# Patient Record
Sex: MALE | Race: WHITE | Employment: FULL TIME | URBAN - METROPOLITAN AREA
[De-identification: names, ages, dates, MRNs, and addresses within clinical notes are randomized per-mention and may not be internally consistent; named-entity substitution may affect disease eponyms.]

---

## 2020-05-08 ENCOUNTER — APPOINTMENT (OUTPATIENT)
Dept: GENERAL RADIOLOGY | Age: 65
DRG: 145 | End: 2020-05-08
Payer: COMMERCIAL

## 2020-05-08 ENCOUNTER — APPOINTMENT (OUTPATIENT)
Dept: CT IMAGING | Age: 65
DRG: 145 | End: 2020-05-08
Payer: COMMERCIAL

## 2020-05-08 ENCOUNTER — HOSPITAL ENCOUNTER (INPATIENT)
Age: 65
LOS: 4 days | Discharge: HOME | DRG: 145 | End: 2020-05-12
Attending: INTERNAL MEDICINE | Admitting: INTERNAL MEDICINE
Payer: COMMERCIAL

## 2020-05-08 PROBLEM — R06.02 SHORTNESS OF BREATH: Status: ACTIVE | Noted: 2020-05-08

## 2020-05-08 LAB
ALBUMIN SERPL-MCNC: 4 GM/DL (ref 3.4–5)
ALP BLD-CCNC: 77 IU/L (ref 40–128)
ALT SERPL-CCNC: 26 U/L (ref 10–40)
ANION GAP SERPL CALCULATED.3IONS-SCNC: 12 MMOL/L (ref 4–16)
AST SERPL-CCNC: 21 IU/L (ref 15–37)
BASOPHILS ABSOLUTE: 0.1 K/CU MM
BASOPHILS RELATIVE PERCENT: 0.6 % (ref 0–1)
BILIRUB SERPL-MCNC: 0.4 MG/DL (ref 0–1)
BUN BLDV-MCNC: 7 MG/DL (ref 6–23)
CALCIUM SERPL-MCNC: 9 MG/DL (ref 8.3–10.6)
CHLORIDE BLD-SCNC: 102 MMOL/L (ref 99–110)
CO2: 23 MMOL/L (ref 21–32)
CREAT SERPL-MCNC: 0.8 MG/DL (ref 0.9–1.3)
DIFFERENTIAL TYPE: ABNORMAL
EOSINOPHILS ABSOLUTE: 0.6 K/CU MM
EOSINOPHILS RELATIVE PERCENT: 5.5 % (ref 0–3)
GFR AFRICAN AMERICAN: >60 ML/MIN/1.73M2
GFR NON-AFRICAN AMERICAN: >60 ML/MIN/1.73M2
GLUCOSE BLD-MCNC: 129 MG/DL (ref 70–99)
HCT VFR BLD CALC: 43.4 % (ref 42–52)
HEMOGLOBIN: 14.6 GM/DL (ref 13.5–18)
IMMATURE NEUTROPHIL %: 0.4 % (ref 0–0.43)
LYMPHOCYTES ABSOLUTE: 1.5 K/CU MM
LYMPHOCYTES RELATIVE PERCENT: 13.2 % (ref 24–44)
MCH RBC QN AUTO: 30.5 PG (ref 27–31)
MCHC RBC AUTO-ENTMCNC: 33.6 % (ref 32–36)
MCV RBC AUTO: 90.8 FL (ref 78–100)
MONOCYTES ABSOLUTE: 0.8 K/CU MM
MONOCYTES RELATIVE PERCENT: 6.6 % (ref 0–4)
NUCLEATED RBC %: 0 %
PDW BLD-RTO: 12.9 % (ref 11.7–14.9)
PLATELET # BLD: 166 K/CU MM (ref 140–440)
PMV BLD AUTO: 11.4 FL (ref 7.5–11.1)
POTASSIUM SERPL-SCNC: 3.7 MMOL/L (ref 3.5–5.1)
PRO-BNP: 21.75 PG/ML
PROCALCITONIN: 0.07
RBC # BLD: 4.78 M/CU MM (ref 4.6–6.2)
SEGMENTED NEUTROPHILS ABSOLUTE COUNT: 8.3 K/CU MM
SEGMENTED NEUTROPHILS RELATIVE PERCENT: 73.7 % (ref 36–66)
SODIUM BLD-SCNC: 137 MMOL/L (ref 135–145)
TOTAL IMMATURE NEUTOROPHIL: 0.05 K/CU MM
TOTAL NUCLEATED RBC: 0 K/CU MM
TOTAL PROTEIN: 6.9 GM/DL (ref 6.4–8.2)
TROPONIN T: <0.01 NG/ML
WBC # BLD: 11.3 K/CU MM (ref 4–10.5)

## 2020-05-08 PROCEDURE — 99285 EMERGENCY DEPT VISIT HI MDM: CPT

## 2020-05-08 PROCEDURE — 87486 CHLMYD PNEUM DNA AMP PROBE: CPT

## 2020-05-08 PROCEDURE — 84145 PROCALCITONIN (PCT): CPT

## 2020-05-08 PROCEDURE — 87798 DETECT AGENT NOS DNA AMP: CPT

## 2020-05-08 PROCEDURE — 2060000000 HC ICU INTERMEDIATE R&B

## 2020-05-08 PROCEDURE — 94640 AIRWAY INHALATION TREATMENT: CPT

## 2020-05-08 PROCEDURE — 85025 COMPLETE CBC W/AUTO DIFF WBC: CPT

## 2020-05-08 PROCEDURE — U0002 COVID-19 LAB TEST NON-CDC: HCPCS

## 2020-05-08 PROCEDURE — 86141 C-REACTIVE PROTEIN HS: CPT

## 2020-05-08 PROCEDURE — 80053 COMPREHEN METABOLIC PANEL: CPT

## 2020-05-08 PROCEDURE — 93005 ELECTROCARDIOGRAM TRACING: CPT | Performed by: PHYSICIAN ASSISTANT

## 2020-05-08 PROCEDURE — 94761 N-INVAS EAR/PLS OXIMETRY MLT: CPT

## 2020-05-08 PROCEDURE — 87581 M.PNEUMON DNA AMP PROBE: CPT

## 2020-05-08 PROCEDURE — 2580000003 HC RX 258: Performed by: NURSE PRACTITIONER

## 2020-05-08 PROCEDURE — 6360000004 HC RX CONTRAST MEDICATION: Performed by: PHYSICIAN ASSISTANT

## 2020-05-08 PROCEDURE — 6370000000 HC RX 637 (ALT 250 FOR IP): Performed by: PHYSICIAN ASSISTANT

## 2020-05-08 PROCEDURE — 6370000000 HC RX 637 (ALT 250 FOR IP): Performed by: NURSE PRACTITIONER

## 2020-05-08 PROCEDURE — 71045 X-RAY EXAM CHEST 1 VIEW: CPT

## 2020-05-08 PROCEDURE — 2580000003 HC RX 258: Performed by: PHYSICIAN ASSISTANT

## 2020-05-08 PROCEDURE — 84484 ASSAY OF TROPONIN QUANT: CPT

## 2020-05-08 PROCEDURE — 71275 CT ANGIOGRAPHY CHEST: CPT

## 2020-05-08 PROCEDURE — 87633 RESP VIRUS 12-25 TARGETS: CPT

## 2020-05-08 PROCEDURE — 83880 ASSAY OF NATRIURETIC PEPTIDE: CPT

## 2020-05-08 PROCEDURE — 6360000002 HC RX W HCPCS: Performed by: NURSE PRACTITIONER

## 2020-05-08 RX ORDER — GUAIFENESIN/DEXTROMETHORPHAN 100-10MG/5
5 SYRUP ORAL EVERY 4 HOURS PRN
Status: DISCONTINUED | OUTPATIENT
Start: 2020-05-08 | End: 2020-05-12 | Stop reason: HOSPADM

## 2020-05-08 RX ORDER — ACETAMINOPHEN 650 MG/1
650 SUPPOSITORY RECTAL EVERY 6 HOURS PRN
Status: DISCONTINUED | OUTPATIENT
Start: 2020-05-08 | End: 2020-05-12 | Stop reason: HOSPADM

## 2020-05-08 RX ORDER — OMEPRAZOLE 20 MG/1
20 CAPSULE, DELAYED RELEASE ORAL DAILY
COMMUNITY

## 2020-05-08 RX ORDER — BENZONATATE 100 MG/1
200 CAPSULE ORAL ONCE
Status: COMPLETED | OUTPATIENT
Start: 2020-05-08 | End: 2020-05-08

## 2020-05-08 RX ORDER — DOXAZOSIN MESYLATE 4 MG/1
2 TABLET ORAL DAILY
Status: DISCONTINUED | OUTPATIENT
Start: 2020-05-09 | End: 2020-05-12 | Stop reason: HOSPADM

## 2020-05-08 RX ORDER — SODIUM CHLORIDE 0.9 % (FLUSH) 0.9 %
10 SYRINGE (ML) INJECTION EVERY 12 HOURS SCHEDULED
Status: DISCONTINUED | OUTPATIENT
Start: 2020-05-08 | End: 2020-05-12 | Stop reason: HOSPADM

## 2020-05-08 RX ORDER — ALBUTEROL SULFATE 90 UG/1
2 AEROSOL, METERED RESPIRATORY (INHALATION) EVERY 6 HOURS PRN
Status: DISCONTINUED | OUTPATIENT
Start: 2020-05-08 | End: 2020-05-12 | Stop reason: HOSPADM

## 2020-05-08 RX ORDER — ONDANSETRON 2 MG/ML
4 INJECTION INTRAMUSCULAR; INTRAVENOUS EVERY 6 HOURS PRN
Status: DISCONTINUED | OUTPATIENT
Start: 2020-05-08 | End: 2020-05-12 | Stop reason: HOSPADM

## 2020-05-08 RX ORDER — SODIUM CHLORIDE 9 MG/ML
INJECTION, SOLUTION INTRAVENOUS CONTINUOUS
Status: CANCELLED | OUTPATIENT
Start: 2020-05-08 | End: 2020-05-09

## 2020-05-08 RX ORDER — LANOLIN ALCOHOL/MO/W.PET/CERES
3 CREAM (GRAM) TOPICAL NIGHTLY PRN
Status: DISCONTINUED | OUTPATIENT
Start: 2020-05-08 | End: 2020-05-12 | Stop reason: HOSPADM

## 2020-05-08 RX ORDER — GUAIFENESIN AND CODEINE PHOSPHATE 100; 10 MG/5ML; MG/5ML
10 SOLUTION ORAL ONCE
Status: DISCONTINUED | OUTPATIENT
Start: 2020-05-08 | End: 2020-05-08

## 2020-05-08 RX ORDER — SODIUM CHLORIDE 9 MG/ML
INJECTION, SOLUTION INTRAVENOUS CONTINUOUS
Status: DISCONTINUED | OUTPATIENT
Start: 2020-05-08 | End: 2020-05-11

## 2020-05-08 RX ORDER — 0.9 % SODIUM CHLORIDE 0.9 %
1000 INTRAVENOUS SOLUTION INTRAVENOUS ONCE
Status: COMPLETED | OUTPATIENT
Start: 2020-05-08 | End: 2020-05-08

## 2020-05-08 RX ORDER — PANTOPRAZOLE SODIUM 40 MG/1
40 TABLET, DELAYED RELEASE ORAL
Status: DISCONTINUED | OUTPATIENT
Start: 2020-05-09 | End: 2020-05-12 | Stop reason: HOSPADM

## 2020-05-08 RX ORDER — SODIUM CHLORIDE 0.9 % (FLUSH) 0.9 %
10 SYRINGE (ML) INJECTION 2 TIMES DAILY
Status: DISCONTINUED | OUTPATIENT
Start: 2020-05-08 | End: 2020-05-12 | Stop reason: HOSPADM

## 2020-05-08 RX ORDER — PROMETHAZINE HYDROCHLORIDE 25 MG/1
12.5 TABLET ORAL EVERY 6 HOURS PRN
Status: DISCONTINUED | OUTPATIENT
Start: 2020-05-08 | End: 2020-05-12 | Stop reason: HOSPADM

## 2020-05-08 RX ORDER — METHYLPREDNISOLONE SODIUM SUCCINATE 40 MG/ML
40 INJECTION, POWDER, LYOPHILIZED, FOR SOLUTION INTRAMUSCULAR; INTRAVENOUS EVERY 8 HOURS SCHEDULED
Status: DISCONTINUED | OUTPATIENT
Start: 2020-05-08 | End: 2020-05-12 | Stop reason: HOSPADM

## 2020-05-08 RX ORDER — ALBUTEROL SULFATE 90 UG/1
2 AEROSOL, METERED RESPIRATORY (INHALATION) EVERY 6 HOURS PRN
COMMUNITY
Start: 2020-04-17

## 2020-05-08 RX ORDER — GUAIFENESIN 600 MG/1
600 TABLET, EXTENDED RELEASE ORAL ONCE
Status: COMPLETED | OUTPATIENT
Start: 2020-05-08 | End: 2020-05-08

## 2020-05-08 RX ORDER — POLYETHYLENE GLYCOL 3350 17 G/17G
17 POWDER, FOR SOLUTION ORAL DAILY PRN
Status: DISCONTINUED | OUTPATIENT
Start: 2020-05-08 | End: 2020-05-12 | Stop reason: HOSPADM

## 2020-05-08 RX ORDER — TERAZOSIN 2 MG/1
2 CAPSULE ORAL NIGHTLY
COMMUNITY
Start: 2020-04-28

## 2020-05-08 RX ORDER — ACETAMINOPHEN 325 MG/1
650 TABLET ORAL EVERY 6 HOURS PRN
Status: DISCONTINUED | OUTPATIENT
Start: 2020-05-08 | End: 2020-05-12 | Stop reason: HOSPADM

## 2020-05-08 RX ORDER — SODIUM CHLORIDE 0.9 % (FLUSH) 0.9 %
10 SYRINGE (ML) INJECTION PRN
Status: DISCONTINUED | OUTPATIENT
Start: 2020-05-08 | End: 2020-05-12 | Stop reason: HOSPADM

## 2020-05-08 RX ORDER — ALBUTEROL SULFATE 90 UG/1
2 AEROSOL, METERED RESPIRATORY (INHALATION) ONCE
Status: COMPLETED | OUTPATIENT
Start: 2020-05-08 | End: 2020-05-08

## 2020-05-08 RX ADMIN — GUAIFENESIN AND DEXTROMETHORPHAN 5 ML: 100; 10 SYRUP ORAL at 23:30

## 2020-05-08 RX ADMIN — METHYLPREDNISOLONE SODIUM SUCCINATE 40 MG: 40 INJECTION, POWDER, FOR SOLUTION INTRAMUSCULAR; INTRAVENOUS at 23:30

## 2020-05-08 RX ADMIN — ALBUTEROL SULFATE 2 PUFF: 90 AEROSOL, METERED RESPIRATORY (INHALATION) at 18:50

## 2020-05-08 RX ADMIN — SODIUM CHLORIDE: 9 INJECTION, SOLUTION INTRAVENOUS at 23:26

## 2020-05-08 RX ADMIN — GUAIFENESIN 600 MG: 600 TABLET, EXTENDED RELEASE ORAL at 21:27

## 2020-05-08 RX ADMIN — MELATONIN 3 MG: at 23:30

## 2020-05-08 RX ADMIN — SODIUM CHLORIDE, PRESERVATIVE FREE 10 ML: 5 INJECTION INTRAVENOUS at 23:30

## 2020-05-08 RX ADMIN — IOPAMIDOL 90 ML: 755 INJECTION, SOLUTION INTRAVENOUS at 19:59

## 2020-05-08 RX ADMIN — SODIUM CHLORIDE 1000 ML: 9 INJECTION, SOLUTION INTRAVENOUS at 21:27

## 2020-05-08 RX ADMIN — SODIUM CHLORIDE, PRESERVATIVE FREE 10 ML: 5 INJECTION INTRAVENOUS at 19:59

## 2020-05-08 RX ADMIN — AZITHROMYCIN MONOHYDRATE 500 MG: 500 INJECTION, POWDER, LYOPHILIZED, FOR SOLUTION INTRAVENOUS at 23:30

## 2020-05-08 RX ADMIN — BENZONATATE 200 MG: 100 CAPSULE ORAL at 18:47

## 2020-05-08 RX ADMIN — SODIUM CHLORIDE 1000 ML: 9 INJECTION, SOLUTION INTRAVENOUS at 18:47

## 2020-05-08 NOTE — ED NOTES
Bed: ED-02  Expected date:   Expected time:   Means of arrival:   Comments:  EMS     Gustavo Pickens RN  05/08/20 8585

## 2020-05-08 NOTE — ED PROVIDER NOTES
None   Lifestyle    Physical activity     Days per week: None     Minutes per session: None    Stress: None   Relationships    Social connections     Talks on phone: None     Gets together: None     Attends Mosque service: None     Active member of club or organization: None     Attends meetings of clubs or organizations: None     Relationship status: None    Intimate partner violence     Fear of current or ex partner: None     Emotionally abused: None     Physically abused: None     Forced sexual activity: None   Other Topics Concern    None   Social History Narrative    None       PHYSICAL EXAM    VITAL SIGNS: BP (!) 147/94   Pulse 113   Temp 98.7 °F (37.1 °C)   Resp 17   Ht 5' 7\" (1.702 m)   Wt 188 lb (85.3 kg)   SpO2 97%   BMI 29.44 kg/m²   Constitutional:  Well developed, well nourished, no acute distress, non-toxic appearance. Frequent harsh bronchospastic coughing spasms throughout exam.  Eyes: Conjunctiva normal, sclera non-icteric  HEENT:  Normocephalic, atraumatic. Nasal passages with mildly erythematous and edematous turbinates. No masses. Tolerating oral secretions. No hot potato voice. No inspiratory stridor. Neck/Lymphatics:    - supple, no JVD, no swollen nodes   - No nuchal rigidity. Trachea is midline. Respiratory:  94% on room air. 97% on 2 L patient has had improvement of shortness of breath on nasal cannula. Very coarse air exchange throughout with scattered expiratory wheezing or rhonchi. No rales. Speaking full sentences with difficulty. No retractions, no accessory muscle use  Cardiovascular: Tachycardic rate 120 bpm, regular rhythm. Normal S1/S2. GI:  Soft, no abdominal tenderness, no guarding. Musculoskeletal:  No obvious deficits, calves are symmetrical.  No pre-to pitting edema.     Integument:  Skin pink, warm, dry, intact       LABS:  Results for orders placed or performed during the hospital encounter of 05/08/20   CBC Auto Differential   Result Value Ref Range    WBC 11.3 (H) 4.0 - 10.5 K/CU MM    RBC 4.78 4.6 - 6.2 M/CU MM    Hemoglobin 14.6 13.5 - 18.0 GM/DL    Hematocrit 43.4 42 - 52 %    MCV 90.8 78 - 100 FL    MCH 30.5 27 - 31 PG    MCHC 33.6 32.0 - 36.0 %    RDW 12.9 11.7 - 14.9 %    Platelets 892 770 - 767 K/CU MM    MPV 11.4 (H) 7.5 - 11.1 FL    Differential Type AUTOMATED DIFFERENTIAL     Segs Relative 73.7 (H) 36 - 66 %    Lymphocytes % 13.2 (L) 24 - 44 %    Monocytes % 6.6 (H) 0 - 4 %    Eosinophils % 5.5 (H) 0 - 3 %    Basophils % 0.6 0 - 1 %    Segs Absolute 8.3 K/CU MM    Lymphocytes Absolute 1.5 K/CU MM    Monocytes Absolute 0.8 K/CU MM    Eosinophils Absolute 0.6 K/CU MM    Basophils Absolute 0.1 K/CU MM    Nucleated RBC % 0.0 %    Total Nucleated RBC 0.0 K/CU MM    Total Immature Neutrophil 0.05 K/CU MM    Immature Neutrophil % 0.4 0 - 0.43 %   Comprehensive Metabolic Panel   Result Value Ref Range    Sodium 137 135 - 145 MMOL/L    Potassium 3.7 3.5 - 5.1 MMOL/L    Chloride 102 99 - 110 mMol/L    CO2 23 21 - 32 MMOL/L    BUN 7 6 - 23 MG/DL    CREATININE 0.8 (L) 0.9 - 1.3 MG/DL    Glucose 129 (H) 70 - 99 MG/DL    Calcium 9.0 8.3 - 10.6 MG/DL    Alb 4.0 3.4 - 5.0 GM/DL    Total Protein 6.9 6.4 - 8.2 GM/DL    Total Bilirubin 0.4 0.0 - 1.0 MG/DL    ALT 26 10 - 40 U/L    AST 21 15 - 37 IU/L    Alkaline Phosphatase 77 40 - 128 IU/L    GFR Non-African American >60 >60 mL/min/1.73m2    GFR African American >60 >60 mL/min/1.73m2    Anion Gap 12 4 - 16   Troponin   Result Value Ref Range    Troponin T <0.010 <0.01 NG/ML   Brain Natriuretic Peptide   Result Value Ref Range    Pro-BNP 21.75 <300 PG/ML   EKG 12 Lead   Result Value Ref Range    Ventricular Rate 102 BPM    Atrial Rate 102 BPM    P-R Interval 140 ms    QRS Duration 90 ms    Q-T Interval 352 ms    QTc Calculation (Bazett) 458 ms    P Axis 60 degrees    R Axis 40 degrees    T Axis 72 degrees    Diagnosis       Sinus tachycardia  Otherwise normal ECG  No previous ECGs available centrally.  Small amount of secretions within a bronchus supplying  the posterior right upper lobe. Upper Abdomen: Small hiatal hernia. Soft Tissues/Bones: No acute bone or soft tissue abnormality.                    XR CHEST PORTABLE (Final result)   Result time 05/08/20 18:24:26   Final result by Darilyn Ormond, MD (05/08/20 18:24:26)                Impression:    No acute findings            Narrative:    EXAMINATION:  ONE XRAY VIEW OF THE CHEST    5/8/2020 5:30 pm    COMPARISON:  April 15, 2009    HISTORY:  ORDERING SYSTEM PROVIDED HISTORY: chest pain  TECHNOLOGIST PROVIDED HISTORY:  Reason for exam:->chest pain  Reason for Exam: chest pain, cough, sob x 7 weeks  Acuity: Acute  Type of Exam: Initial  Additional signs and symptoms: na  Relevant Medical/Surgical History: na    FINDINGS:  Cardiac silhouette is normal in size.  Lungs are clear.  No acute bony  abnormality. EKG Interpretation  Please see ED physician's note - Dr. Cortez Elliott - for EKG interpretation      ED COURSE & MEDICAL DECISION MAKING       Vital signs and nursing notes reviewed during ED course. I have independently evaluated this patient. Supervising physician - Dr. Cortez Elliott - present in the Emergency Department, available for consultation, throughout entirety of  patient care. All pertinent Lab data and radiographic results reviewed with patient at bedside. Clinical  IMPRESSION    1. Dyspnea, unspecified type    2. Bronchitis    3. Tachycardia        Patient presents with several weeks of persistent productive cough and shortness of breath following 4 rounds of antibiotics. On exam, nontoxic 81-year-old male, sitting comfortably upright in bed but frequent bronchospastic coughing spasms throughout exam.  94% on room air, was placed on to 2 L nasal cannula for comfort, improved to 97% had improvement of shortness of breath. Noted to be tachycardic but afebrile. HEENT exam is unremarkable.   Lungs with well as full PPE was followed throughout patient's ED course and evaluation. Patient was masked throughout ED course. Diagnosis, disposition, and plan discussed in detail with patient who understands and agrees. In consideration of current COVID19 pandemic, with effort to minimize unnecessary provider exposure, this patient was seen at bedside by me independently. However, in compliance with current hospital BURKE/ED protocol, prior to admission I did discuss this patient case with emergency department physician, Dr. Alvin Meza, who did agree with ED workup/evaluation and plan for admission. Of note, this Pt was NOT admitted to the ICU. Disposition referral (if applicable):  No follow-up provider specified. Disposition medications (if applicable):  New Prescriptions    No medications on file           Comment: Please note this report has been produced using speech recognition software and may contain errors related to that system including errors in grammar, punctuation, and spelling, as well as words and phrases that may be inappropriate. If there are any questions or concerns please feel free to contact the dictating provider for clarification.         Samir Aguilera PA-C  05/08/20 3317

## 2020-05-08 NOTE — ED PROVIDER NOTES
12 lead EKG per my interpretation:  Sinus Tachycardia at 102  Axis is   Normal  QTc is  within an acceptable range  There is no specific T wave changes appreciated. There is no specific ST wave changes appreciated. No STEMI    Prior EKG to compare with was not available.     Bakari Jeronimo MD  05/08/20 9533

## 2020-05-09 LAB
ADENOVIRUS DETECTION BY PCR: NOT DETECTED
ANION GAP SERPL CALCULATED.3IONS-SCNC: 11 MMOL/L (ref 4–16)
BASOPHILS ABSOLUTE: 0.1 K/CU MM
BASOPHILS RELATIVE PERCENT: 0.5 % (ref 0–1)
BORDETELLA PARAPERTUSSIS BY PCR: NOT DETECTED
BORDETELLA PERTUSSIS PCR: NOT DETECTED
BUN BLDV-MCNC: 5 MG/DL (ref 6–23)
CALCIUM SERPL-MCNC: 8.9 MG/DL (ref 8.3–10.6)
CHLAMYDOPHILA PNEUMONIA PCR: NOT DETECTED
CHLORIDE BLD-SCNC: 105 MMOL/L (ref 99–110)
CO2: 23 MMOL/L (ref 21–32)
CORONAVIRUS 229E PCR: NOT DETECTED
CORONAVIRUS HKU1 PCR: NOT DETECTED
CORONAVIRUS NL63 PCR: NOT DETECTED
CORONAVIRUS OC43 PCR: NOT DETECTED
CREAT SERPL-MCNC: 0.7 MG/DL (ref 0.9–1.3)
DIFFERENTIAL TYPE: ABNORMAL
EOSINOPHILS ABSOLUTE: 0.2 K/CU MM
EOSINOPHILS RELATIVE PERCENT: 1.5 % (ref 0–3)
GFR AFRICAN AMERICAN: >60 ML/MIN/1.73M2
GFR NON-AFRICAN AMERICAN: >60 ML/MIN/1.73M2
GLUCOSE BLD-MCNC: 144 MG/DL (ref 70–99)
HCT VFR BLD CALC: 44.8 % (ref 42–52)
HEMOGLOBIN: 14.5 GM/DL (ref 13.5–18)
HIGH SENSITIVE C-REACTIVE PROTEIN: 6 MG/L
HUMAN METAPNEUMOVIRUS PCR: NOT DETECTED
IMMATURE NEUTROPHIL %: 0.5 % (ref 0–0.43)
INFLUENZA A BY PCR: NOT DETECTED
INFLUENZA A H1 (2009) PCR: NOT DETECTED
INFLUENZA A H1 PANDEMIC PCR: NOT DETECTED
INFLUENZA A H3 PCR: NOT DETECTED
INFLUENZA B BY PCR: NOT DETECTED
LYMPHOCYTES ABSOLUTE: 0.7 K/CU MM
LYMPHOCYTES RELATIVE PERCENT: 7 % (ref 24–44)
MCH RBC QN AUTO: 30.4 PG (ref 27–31)
MCHC RBC AUTO-ENTMCNC: 32.4 % (ref 32–36)
MCV RBC AUTO: 93.9 FL (ref 78–100)
MONOCYTES ABSOLUTE: 0.1 K/CU MM
MONOCYTES RELATIVE PERCENT: 1.1 % (ref 0–4)
MYCOPLASMA PNEUMONIAE PCR: NOT DETECTED
NUCLEATED RBC %: 0 %
PARAINFLUENZA 1 PCR: NOT DETECTED
PARAINFLUENZA 2 PCR: NOT DETECTED
PARAINFLUENZA 3 PCR: NOT DETECTED
PARAINFLUENZA 4 PCR: NOT DETECTED
PDW BLD-RTO: 12.9 % (ref 11.7–14.9)
PLATELET # BLD: 175 K/CU MM (ref 140–440)
PMV BLD AUTO: 11.9 FL (ref 7.5–11.1)
POTASSIUM SERPL-SCNC: 4.8 MMOL/L (ref 3.5–5.1)
RBC # BLD: 4.77 M/CU MM (ref 4.6–6.2)
RHINOVIRUS ENTEROVIRUS PCR: NOT DETECTED
RSV PCR: NOT DETECTED
SEGMENTED NEUTROPHILS ABSOLUTE COUNT: 9.5 K/CU MM
SEGMENTED NEUTROPHILS RELATIVE PERCENT: 89.4 % (ref 36–66)
SODIUM BLD-SCNC: 139 MMOL/L (ref 135–145)
TOTAL IMMATURE NEUTOROPHIL: 0.05 K/CU MM
TOTAL NUCLEATED RBC: 0 K/CU MM
WBC # BLD: 10.6 K/CU MM (ref 4–10.5)

## 2020-05-09 PROCEDURE — 93010 ELECTROCARDIOGRAM REPORT: CPT | Performed by: INTERNAL MEDICINE

## 2020-05-09 PROCEDURE — 85025 COMPLETE CBC W/AUTO DIFF WBC: CPT

## 2020-05-09 PROCEDURE — 87040 BLOOD CULTURE FOR BACTERIA: CPT

## 2020-05-09 PROCEDURE — 2580000003 HC RX 258: Performed by: PHYSICIAN ASSISTANT

## 2020-05-09 PROCEDURE — 6370000000 HC RX 637 (ALT 250 FOR IP): Performed by: NURSE PRACTITIONER

## 2020-05-09 PROCEDURE — 87081 CULTURE SCREEN ONLY: CPT

## 2020-05-09 PROCEDURE — 6360000002 HC RX W HCPCS: Performed by: NURSE PRACTITIONER

## 2020-05-09 PROCEDURE — 94640 AIRWAY INHALATION TREATMENT: CPT

## 2020-05-09 PROCEDURE — 94761 N-INVAS EAR/PLS OXIMETRY MLT: CPT

## 2020-05-09 PROCEDURE — 6370000000 HC RX 637 (ALT 250 FOR IP): Performed by: INTERNAL MEDICINE

## 2020-05-09 PROCEDURE — 2580000003 HC RX 258: Performed by: NURSE PRACTITIONER

## 2020-05-09 PROCEDURE — 2700000000 HC OXYGEN THERAPY PER DAY

## 2020-05-09 PROCEDURE — 2060000000 HC ICU INTERMEDIATE R&B

## 2020-05-09 PROCEDURE — 80048 BASIC METABOLIC PNL TOTAL CA: CPT

## 2020-05-09 RX ORDER — BUDESONIDE AND FORMOTEROL FUMARATE DIHYDRATE 160; 4.5 UG/1; UG/1
2 AEROSOL RESPIRATORY (INHALATION) 2 TIMES DAILY
Status: DISCONTINUED | OUTPATIENT
Start: 2020-05-09 | End: 2020-05-12 | Stop reason: HOSPADM

## 2020-05-09 RX ORDER — FLUTICASONE PROPIONATE 50 MCG
1 SPRAY, SUSPENSION (ML) NASAL DAILY
Status: DISCONTINUED | OUTPATIENT
Start: 2020-05-09 | End: 2020-05-11

## 2020-05-09 RX ORDER — LACTOBACILLUS RHAMNOSUS GG 10B CELL
1 CAPSULE ORAL DAILY
Status: DISCONTINUED | OUTPATIENT
Start: 2020-05-09 | End: 2020-05-12 | Stop reason: HOSPADM

## 2020-05-09 RX ADMIN — METHYLPREDNISOLONE SODIUM SUCCINATE 40 MG: 40 INJECTION, POWDER, FOR SOLUTION INTRAMUSCULAR; INTRAVENOUS at 21:27

## 2020-05-09 RX ADMIN — SODIUM CHLORIDE: 9 INJECTION, SOLUTION INTRAVENOUS at 08:58

## 2020-05-09 RX ADMIN — BUDESONIDE AND FORMOTEROL FUMARATE DIHYDRATE 2 PUFF: 160; 4.5 AEROSOL RESPIRATORY (INHALATION) at 21:31

## 2020-05-09 RX ADMIN — PANTOPRAZOLE SODIUM 40 MG: 40 TABLET, DELAYED RELEASE ORAL at 06:35

## 2020-05-09 RX ADMIN — SODIUM CHLORIDE, PRESERVATIVE FREE 10 ML: 5 INJECTION INTRAVENOUS at 21:27

## 2020-05-09 RX ADMIN — METHYLPREDNISOLONE SODIUM SUCCINATE 40 MG: 40 INJECTION, POWDER, FOR SOLUTION INTRAMUSCULAR; INTRAVENOUS at 06:35

## 2020-05-09 RX ADMIN — SODIUM CHLORIDE, PRESERVATIVE FREE 10 ML: 5 INJECTION INTRAVENOUS at 08:59

## 2020-05-09 RX ADMIN — ALBUTEROL SULFATE 2 PUFF: 90 AEROSOL, METERED RESPIRATORY (INHALATION) at 15:21

## 2020-05-09 RX ADMIN — METHYLPREDNISOLONE SODIUM SUCCINATE 40 MG: 40 INJECTION, POWDER, FOR SOLUTION INTRAMUSCULAR; INTRAVENOUS at 14:50

## 2020-05-09 RX ADMIN — FLUTICASONE PROPIONATE 1 SPRAY: 50 SPRAY, METERED NASAL at 11:35

## 2020-05-09 RX ADMIN — AZITHROMYCIN MONOHYDRATE 250 MG: 500 INJECTION, POWDER, LYOPHILIZED, FOR SOLUTION INTRAVENOUS at 23:03

## 2020-05-09 RX ADMIN — SODIUM CHLORIDE: 9 INJECTION, SOLUTION INTRAVENOUS at 20:06

## 2020-05-09 RX ADMIN — DOXAZOSIN 2 MG: 4 TABLET ORAL at 08:57

## 2020-05-09 RX ADMIN — Medication 1 CAPSULE: at 11:35

## 2020-05-09 RX ADMIN — SODIUM CHLORIDE, PRESERVATIVE FREE 10 ML: 5 INJECTION INTRAVENOUS at 08:58

## 2020-05-09 RX ADMIN — MELATONIN 3 MG: at 23:03

## 2020-05-09 RX ADMIN — GUAIFENESIN AND DEXTROMETHORPHAN 5 ML: 100; 10 SYRUP ORAL at 23:03

## 2020-05-09 ASSESSMENT — PAIN SCALES - GENERAL
PAINLEVEL_OUTOF10: 0
PAINLEVEL_OUTOF10: 0

## 2020-05-09 NOTE — H&P
about 8 weeks ago with sinus drainage, sinus pressure. Cough began several days later, described as post-nasal drip cough. About 4 weeks later, he still had cough, which is productive of clear sputum. Denies fever/chills, myalgias entire episode. He has recently completed azithromycin, which he reports improvement on for 2 days, then symptoms worsened again. He started doxycycline yesterday. He has not been on steroids. He denies hx of asthma, copd. Denies chronic cough prior to sx onset. States he quit smoking 11-12 years ago. Only PMH is HTN. States he presented to ED because wheezing has worsened. At time of exam, denies chest pain/pressure, abdominal pain, back pain, n/v, diarrhea. Confirms code status. Denies regular alcohol use, illicit drug use. Ten point ROS reviewed negative, unless as noted above    Objective:     No intake or output data in the 24 hours ending 05/08/20 2056     Vitals:   Vitals:    05/08/20 1902   BP: (!) 147/94   Pulse: 113   Resp: 17   Temp: 98.7   SpO2: 97%       Physical Exam:     GEN Awake male, sitting upright in bed in no apparent distress. Appears given age. EYES Pupils are equally round. No scleral erythema, discharge, or conjunctivitis. HENT Mucous membranes are moist. Oral pharynx without exudates, no evidence of thrush. NECK Supple, no apparent thyromegaly or masses. RESP Bilateral wheezes throughout. No rales or rhonchi. Symmetric chest movement while on 2 L NC  CARDIO/VASC S1/S2 auscultated. Regular rate without appreciable murmurs, rubs, or gallops. No JVD or carotid bruits. Peripheral pulses equal bilaterally and palpable. No peripheral edema. GI Abdomen is soft without significant tenderness, masses, or guarding. Bowel sounds are normoactive. Rectal exam deferred.  No costovertebral angle tenderness. Steele catheter is not present. HEME/LYMPH No palpable cervical lymphadenopathy and no hepatosplenomegaly. No petechiae or ecchymoses.   MSK No gross joint deformities. Strength equal in BLE and BUE  SKIN Normal coloration, warm, dry. NEURO Cranial nerves appear grossly intact, normal speech, no lateralizing weakness. PSYCH Awake, alert, oriented x 4. Affect appropriate. Past Medical History:        Past Medical History:   Diagnosis Date    Hypertension        PSHX:  Denies     Allergies: Allergies   Allergen Reactions    Amlodipine Swelling and Other (See Comments)     Dizzy    Levofloxacin Other (See Comments)     \"The only way to describe it was it made me feel terrible. \"    Lisinopril Other (See Comments) and Swelling     Lips became swollen and discolored.  Also lightheadedness and low energy    Cephalexin Other (See Comments)     \"Feels terrible\"    Clindamycin Other (See Comments)     \"Feel weird, when I'm driving\"       FAM HX: Denies known family history    Soc HX:   Social History     Socioeconomic History    Marital status:      Spouse name: None    Number of children: None    Years of education: None    Highest education level: None   Occupational History    None   Social Needs    Financial resource strain: None    Food insecurity     Worry: None     Inability: None    Transportation needs     Medical: None     Non-medical: None   Tobacco Use    Smoking status: Former Smoker     Packs/day: 1.00     Types: Cigarettes     Last attempt to quit:      Years since quittin.3    Smokeless tobacco: Never Used   Substance and Sexual Activity    Alcohol use: Not Currently    Drug use: Never    Sexual activity: None   Lifestyle    Physical activity     Days per week: None     Minutes per session: None    Stress: None   Relationships    Social connections     Talks on phone: None     Gets together: None     Attends Mormonism service: None     Active member of club or organization: None     Attends meetings of clubs or organizations: None     Relationship status: None    Intimate partner violence     Fear of current or ex partner: None     Emotionally abused: None     Physically abused: None     Forced sexual activity: None   Other Topics Concern    None   Social History Narrative    None       Medications:     Medications:    sodium chloride flush  10 mL Intravenous BID    sodium chloride  1,000 mL Intravenous Once    guaiFENesin  600 mg Oral Once      albuterol sulfate  (90 Base) MCG/ACT inhaler Inhale 2 puffs into the lungs every 6 hours as needed Historical Provider, MD Needs Review   terazosin (HYTRIN) 2 MG capsule Take 2 mg by mouth nightly Historical Provider, MD Needs Review   omeprazole (PRILOSEC) 20 MG delayed release capsule Take 20 mg by mouth daily Historical Provider, MD Needs Review       Data:     CTA CHEST W CONTRAST [[id number]] Collected: 05/08/20 2005      Order Status: Completed Updated: 05/08/20 2012     Narrative:       EXAMINATION:  CTA OF THE CHEST 5/8/2020 8:00 pm    TECHNIQUE:  CTA of the chest was performed after the administration of intravenous  contrast.  Multiplanar reformatted images are provided for review.  MIP  images are provided for review. Dose modulation, iterative reconstruction,  and/or weight based adjustment of the mA/kV was utilized to reduce the  radiation dose to as low as reasonably achievable. COMPARISON:  None. HISTORY:  ORDERING SYSTEM PROVIDED HISTORY: cough x7 weeks, long distance , eval  for PE  TECHNOLOGIST PROVIDED HISTORY:  Reason for exam:->cough x7 weeks, long distance , eval for PE  Reason for Exam: cough x7 weeks, long distance , eval for PE  Acuity: Acute  Type of Exam: Initial  Additional signs and symptoms: no  Relevant Medical/Surgical History: 90 ml isovue 370 used    FINDINGS:  Pulmonary Arteries: Pulmonary arteries are adequately opacified for  evaluation.  No evidence of intraluminal filling defect to suggest pulmonary  embolism.  Main pulmonary artery is normal in caliber.     Mediastinum: No evidence of mediastinal

## 2020-05-09 NOTE — CONSULTS
patient. REVIEW OF SYSTEMS:    CONSTITUTIONAL:  negative for fevers, chills, diaphoresis, activity change, appetite change, fatigue, night sweats and unexpected weight change. EYES:  negative for blurred vision, eye discharge, visual disturbance and icterus  HEENT:  negative for hearing loss, tinnitus, ear drainage, sinus pressure, nasal congestion, epistaxis and snoring  RESPIRATORY:  See HPI  CARDIOVASCULAR:  negative for chest pain, palpitations, exertional chest pressure/discomfort, edema, syncope  GASTROINTESTINAL:  negative for nausea, vomiting, diarrhea, constipation, blood in stool and abdominal pain  GENITOURINARY:  negative for frequency, dysuria and hematuria  HEMATOLOGIC/LYMPHATIC:  negative for easy bruising, bleeding and lymphadenopathy  ALLERGIC/IMMUNOLOGIC:  negative for recurrent infections, angioedema, anaphylaxis and drug reactions  ENDOCRINE:  negative for weight changes and diabetic symptoms including polyuria, polydipsia and polyphagia    MUSCULOSKELETAL:  negative for  pain, joint swelling, decreased range of motion and muscle weakness  NEUROLOGICAL:  negative for headaches, slurred speech, unilateral weakness  PSYCHIATRIC/BEHAVIORAL: negative for hallucinations, behavioral problems, confusion and agitation. Objective:   PHYSICAL EXAM:      VITALS:    Vitals:    05/08/20 2247 05/09/20 0223 05/09/20 0855 05/09/20 0917   BP:  127/71 (!) 141/84    Pulse:  110 103    Resp:  18 18 15   Temp: 98.7 °F (37.1 °C) 98.7 °F (37.1 °C) 97.9 °F (36.6 °C)    TempSrc: Oral Oral Oral    SpO2:  95% 94% 93%   Weight: 172 lb 13.5 oz (78.4 kg)      Height: 5' 7\" (1.702 m)            CONSTITUTIONAL:  awake, alert, cooperative, no apparent distress, and appears stated age  NECK:  Supple, symmetrical, trachea midline, no adenopathy, thyroid symmetric, not enlarged and no tenderness  CHEST: Chest expansion equal and symmetrical, no intercostal retraction.   LUNGS:  no increased work of breathing, has Lungs/pleura: No pneumothorax or pleural effusion.  Mild bronchial wall   thickening centrally.  Small amount of secretions within a bronchus supplying   the posterior right upper lobe.     Upper Abdomen: Small hiatal hernia.       Soft Tissues/Bones: No acute bone or soft tissue abnormality.           Impression   No pulmonary embolus.       Mild bronchial wall thickening centrally as well as small amount of   secretions within a bronchus in the posterior right upper lobe.           Order History     Open Order Details                     Assessment:     1. Ac bronchospasm probable asthma  2. [id number] rhinitis  3. Ac bronchitis          Plan:     1. D/w pt  2. o2 as needed  3. Bd rx  4. Will start symbicort  5.  cont iv steroids  6. Agree with antibx  7. Full pft as outpt  8.  Thanks will follow

## 2020-05-10 LAB
CULTURE: NORMAL
EMERGENT DISEASE RESULT: NOT DETECTED
Lab: NORMAL
SPECIMEN: NORMAL

## 2020-05-10 PROCEDURE — 94761 N-INVAS EAR/PLS OXIMETRY MLT: CPT

## 2020-05-10 PROCEDURE — 6370000000 HC RX 637 (ALT 250 FOR IP): Performed by: INTERNAL MEDICINE

## 2020-05-10 PROCEDURE — 6370000000 HC RX 637 (ALT 250 FOR IP): Performed by: NURSE PRACTITIONER

## 2020-05-10 PROCEDURE — 6360000002 HC RX W HCPCS: Performed by: NURSE PRACTITIONER

## 2020-05-10 PROCEDURE — 94640 AIRWAY INHALATION TREATMENT: CPT

## 2020-05-10 PROCEDURE — 2060000000 HC ICU INTERMEDIATE R&B

## 2020-05-10 PROCEDURE — 2580000003 HC RX 258: Performed by: PHYSICIAN ASSISTANT

## 2020-05-10 PROCEDURE — 2580000003 HC RX 258: Performed by: NURSE PRACTITIONER

## 2020-05-10 RX ADMIN — BUDESONIDE AND FORMOTEROL FUMARATE DIHYDRATE 2 PUFF: 160; 4.5 AEROSOL RESPIRATORY (INHALATION) at 19:57

## 2020-05-10 RX ADMIN — Medication 1 CAPSULE: at 09:55

## 2020-05-10 RX ADMIN — SODIUM CHLORIDE, PRESERVATIVE FREE 10 ML: 5 INJECTION INTRAVENOUS at 21:54

## 2020-05-10 RX ADMIN — PANTOPRAZOLE SODIUM 40 MG: 40 TABLET, DELAYED RELEASE ORAL at 06:06

## 2020-05-10 RX ADMIN — SODIUM CHLORIDE, PRESERVATIVE FREE 10 ML: 5 INJECTION INTRAVENOUS at 08:42

## 2020-05-10 RX ADMIN — SODIUM CHLORIDE, PRESERVATIVE FREE 10 ML: 5 INJECTION INTRAVENOUS at 08:38

## 2020-05-10 RX ADMIN — FLUTICASONE PROPIONATE 1 SPRAY: 50 SPRAY, METERED NASAL at 08:38

## 2020-05-10 RX ADMIN — SODIUM CHLORIDE, PRESERVATIVE FREE 10 ML: 5 INJECTION INTRAVENOUS at 21:55

## 2020-05-10 RX ADMIN — METHYLPREDNISOLONE SODIUM SUCCINATE 40 MG: 40 INJECTION, POWDER, FOR SOLUTION INTRAMUSCULAR; INTRAVENOUS at 06:06

## 2020-05-10 RX ADMIN — SODIUM CHLORIDE: 9 INJECTION, SOLUTION INTRAVENOUS at 15:30

## 2020-05-10 RX ADMIN — BUDESONIDE AND FORMOTEROL FUMARATE DIHYDRATE 2 PUFF: 160; 4.5 AEROSOL RESPIRATORY (INHALATION) at 09:01

## 2020-05-10 RX ADMIN — METHYLPREDNISOLONE SODIUM SUCCINATE 40 MG: 40 INJECTION, POWDER, FOR SOLUTION INTRAMUSCULAR; INTRAVENOUS at 15:02

## 2020-05-10 RX ADMIN — METHYLPREDNISOLONE SODIUM SUCCINATE 40 MG: 40 INJECTION, POWDER, FOR SOLUTION INTRAMUSCULAR; INTRAVENOUS at 21:53

## 2020-05-10 RX ADMIN — AZITHROMYCIN MONOHYDRATE 250 MG: 500 INJECTION, POWDER, LYOPHILIZED, FOR SOLUTION INTRAVENOUS at 21:54

## 2020-05-10 RX ADMIN — SODIUM CHLORIDE: 9 INJECTION, SOLUTION INTRAVENOUS at 06:06

## 2020-05-10 RX ADMIN — DOXAZOSIN 2 MG: 4 TABLET ORAL at 08:41

## 2020-05-10 ASSESSMENT — PAIN SCALES - GENERAL
PAINLEVEL_OUTOF10: 0

## 2020-05-11 LAB
CULTURE: NORMAL
GRAM SMEAR: NORMAL
Lab: NORMAL
SPECIMEN: NORMAL

## 2020-05-11 PROCEDURE — 94761 N-INVAS EAR/PLS OXIMETRY MLT: CPT

## 2020-05-11 PROCEDURE — 6370000000 HC RX 637 (ALT 250 FOR IP): Performed by: NURSE PRACTITIONER

## 2020-05-11 PROCEDURE — 2580000003 HC RX 258: Performed by: NURSE PRACTITIONER

## 2020-05-11 PROCEDURE — 6370000000 HC RX 637 (ALT 250 FOR IP): Performed by: HOSPITALIST

## 2020-05-11 PROCEDURE — 6360000002 HC RX W HCPCS: Performed by: NURSE PRACTITIONER

## 2020-05-11 PROCEDURE — 2580000003 HC RX 258: Performed by: PHYSICIAN ASSISTANT

## 2020-05-11 PROCEDURE — 94640 AIRWAY INHALATION TREATMENT: CPT

## 2020-05-11 PROCEDURE — 6370000000 HC RX 637 (ALT 250 FOR IP): Performed by: INTERNAL MEDICINE

## 2020-05-11 PROCEDURE — 1200000000 HC SEMI PRIVATE

## 2020-05-11 RX ORDER — OXYMETAZOLINE HYDROCHLORIDE 0.05 G/100ML
2 SPRAY NASAL 2 TIMES DAILY
Status: DISCONTINUED | OUTPATIENT
Start: 2020-05-11 | End: 2020-05-12 | Stop reason: HOSPADM

## 2020-05-11 RX ORDER — FLUTICASONE PROPIONATE 50 MCG
1 SPRAY, SUSPENSION (ML) NASAL 2 TIMES DAILY
Status: DISCONTINUED | OUTPATIENT
Start: 2020-05-11 | End: 2020-05-12 | Stop reason: HOSPADM

## 2020-05-11 RX ADMIN — Medication 1 CAPSULE: at 09:33

## 2020-05-11 RX ADMIN — DOXAZOSIN 2 MG: 4 TABLET ORAL at 09:33

## 2020-05-11 RX ADMIN — AZITHROMYCIN MONOHYDRATE 250 MG: 500 INJECTION, POWDER, LYOPHILIZED, FOR SOLUTION INTRAVENOUS at 23:15

## 2020-05-11 RX ADMIN — OXYMETAZOLINE HYDROCHLORIDE 2 SPRAY: 5 SPRAY NASAL at 12:29

## 2020-05-11 RX ADMIN — METHYLPREDNISOLONE SODIUM SUCCINATE 40 MG: 40 INJECTION, POWDER, FOR SOLUTION INTRAMUSCULAR; INTRAVENOUS at 06:14

## 2020-05-11 RX ADMIN — SODIUM CHLORIDE: 9 INJECTION, SOLUTION INTRAVENOUS at 03:30

## 2020-05-11 RX ADMIN — FLUTICASONE PROPIONATE 1 SPRAY: 50 SPRAY, METERED NASAL at 09:35

## 2020-05-11 RX ADMIN — PANTOPRAZOLE SODIUM 40 MG: 40 TABLET, DELAYED RELEASE ORAL at 06:14

## 2020-05-11 RX ADMIN — METHYLPREDNISOLONE SODIUM SUCCINATE 40 MG: 40 INJECTION, POWDER, FOR SOLUTION INTRAMUSCULAR; INTRAVENOUS at 20:50

## 2020-05-11 RX ADMIN — GUAIFENESIN AND DEXTROMETHORPHAN 5 ML: 100; 10 SYRUP ORAL at 20:50

## 2020-05-11 RX ADMIN — BUDESONIDE AND FORMOTEROL FUMARATE DIHYDRATE 2 PUFF: 160; 4.5 AEROSOL RESPIRATORY (INHALATION) at 20:05

## 2020-05-11 RX ADMIN — MELATONIN 3 MG: at 20:50

## 2020-05-11 RX ADMIN — SODIUM CHLORIDE, PRESERVATIVE FREE 10 ML: 5 INJECTION INTRAVENOUS at 09:34

## 2020-05-11 RX ADMIN — OXYMETAZOLINE HYDROCHLORIDE 2 SPRAY: 5 SPRAY NASAL at 20:53

## 2020-05-11 RX ADMIN — BUDESONIDE AND FORMOTEROL FUMARATE DIHYDRATE 2 PUFF: 160; 4.5 AEROSOL RESPIRATORY (INHALATION) at 08:14

## 2020-05-11 RX ADMIN — METHYLPREDNISOLONE SODIUM SUCCINATE 40 MG: 40 INJECTION, POWDER, FOR SOLUTION INTRAMUSCULAR; INTRAVENOUS at 16:43

## 2020-05-11 RX ADMIN — SODIUM CHLORIDE, PRESERVATIVE FREE 10 ML: 5 INJECTION INTRAVENOUS at 20:52

## 2020-05-11 ASSESSMENT — PAIN SCALES - GENERAL
PAINLEVEL_OUTOF10: 0

## 2020-05-11 NOTE — PROGRESS NOTES
Hospitalist Progress Note      Name:  Candelario Copeland /Age/Sex: 1955  (63 y.o. male)   MRN & CSN:  [id number] & [id number] Admission Date/Time: 2020  5:16 PM   Location:  -A PCP: Alexia Diaz MD         Hospital Day: 4    Assessment and Plan:   Sandeep Almonte a 63 y.o.  male  who presents with cough, shortness of breath     1) Acute on chronic Bronchitis; likely background undiagnosed COPD  CTA without acute process or PE. Mild bronchial wall thickening centrally as well as small amount of secretions within a bronchus in the posterior RUL   Started on breathing treatment, steroids and abx  COVID 19 pending  CRP and Procal both not elevated  Pulmo on board  Patient will benefit from OP PFT  - breathing is improving. Start Afrin for sinus drainage. COVID-19 negative. Patient continues to suction drainage from the back of his throat. Pulmonology has increased Flonase.           Chronic medical conditions; medication resumed unless contraindicated  - HTN    Diet DIET GENERAL; No Added Salt (3-4 GM)   DVT Prophylaxis [] Lovenox, []  Heparin, [] SCDs, [] Ambulation   GI Prophylaxis [] PPI,  [] H2 Blocker,  [] Carafate,  [] Diet/Tube Feeds   Code Status Full Code   Disposition TRANSFER OFF COVID FLOOR   MDM      History of Present Illness:     -patient still complains of cough and sinus drainage  -he has been suctioning himself    Ten point ROS reviewed negative, unless as noted above    Objective: Intake/Output Summary (Last 24 hours) at 2020 0908  Last data filed at 2020  Gross per 24 hour   Intake 3080 ml   Output 2025 ml   Net 1055 ml      Vitals:   Vitals:    20 0831   BP: (!) 143/79   Pulse: 91   Resp: 16   Temp: 97.2 °F (36.2 °C)   SpO2: 95%     Physical Exam:   GEN Awake male, sitting upright in bed in no apparent distress. Appears given age. HEENT NCAT  RESP Mostly clear, may have slight wheeze   CARDIO/VASC S1/S2 auscultated.  RRR  GI Soft nt nd

## 2020-05-11 NOTE — PLAN OF CARE
Problem: Infection:  Goal: Will remain free from infection  Description: Will remain free from infection  5/10/2020 2336 by Erendira Kaba RN  Outcome: Ongoing  5/10/2020 1552 by Efra Clark RN  Outcome: Ongoing     Problem: Safety:  Goal: Free from accidental physical injury  Description: Free from accidental physical injury  5/10/2020 2336 by Erendira Kaba RN  Outcome: Ongoing  5/10/2020 1552 by Efra Clark RN  Outcome: Ongoing  Goal: Free from intentional harm  Description: Free from intentional harm  5/10/2020 2336 by Erendira Kaba RN  Outcome: Ongoing  5/10/2020 1552 by Efra Clark RN  Outcome: Ongoing     Problem: Daily Care:  Goal: Daily care needs are met  Description: Daily care needs are met  5/10/2020 2336 by Erendira Kaba RN  Outcome: Ongoing  5/10/2020 1552 by Efra Clark RN  Outcome: Ongoing     Problem: Pain:  Goal: Patient's pain/discomfort is manageable  Description: Patient's pain/discomfort is manageable  5/10/2020 2336 by Erendira Kaba RN  Outcome: Ongoing  5/10/2020 1552 by Efra Clark RN  Outcome: Ongoing     Problem: Discharge Planning:  Goal: Patients continuum of care needs are met  Description: Patients continuum of care needs are met  5/10/2020 2336 by Erendira Kaba RN  Outcome: Ongoing  5/10/2020 1552 by Efra Clark RN  Outcome: Ongoing     Problem: Breathing Pattern - Ineffective:  Goal: Ability to achieve and maintain a regular respiratory rate will improve  Description: Ability to achieve and maintain a regular respiratory rate will improve  5/10/2020 2336 by Erendira Kaba RN  Outcome: Ongoing  5/10/2020 1552 by Efra Clark RN  Outcome: Ongoing     Problem: Falls - Risk of:  Goal: Will remain free from falls  Description: Will remain free from falls  5/10/2020 2336 by Erendira Kaba RN  Outcome: Ongoing  5/10/2020 1552 by Efra Clark RN  Outcome: Ongoing  Goal: Absence of physical injury  Description: Absence of physical

## 2020-05-11 NOTE — PROGRESS NOTES
Pt alert and oriented. Covid test negative and hospitalist notified. Reply was to have the rounding doctor give transfer orders off the floor in AM.  Pt continues to have a productive cough. Ambulates independently in room.

## 2020-05-11 NOTE — PROGRESS NOTES
pulmonary      SUBJECTIVE:  Upper airways secretuions a major issue for pt     OBJECTIVE    VITALS:  BP (!) 143/79   Pulse 91   Temp 97.2 °F (36.2 °C) (Oral)   Resp 16   Ht 5' 7\" (1.702 m)   Wt 193 lb 12.6 oz (87.9 kg)   SpO2 95%   BMI 30.35 kg/m²   HEAD AND FACE EXAM:  No throat injection, no active exudate,no thrush  NECK EXAM;No JVD, no masses, symmetrical  CHEST EXAM; Expansion equal and symmetrical, no masses  LUNG EXAM; Good breath sounds bilaterally. There are expiratory wheezes both lungs, there are crackles at both lung bases  CARDIOVASCULAR EXAM: Positive S1 and S2, no S3 or S4, no clicks ,no murmurs  RIGHT AND LEFT LOWER EXTRIMITY EXAM: No edema, no swelling, no inflamation  CNS EXAM: Alert and oriented X3          LABS   Lab Results   Component Value Date    WBC 10.6 (H) 05/09/2020    HGB 14.5 05/09/2020    HCT 44.8 05/09/2020    MCV 93.9 05/09/2020     05/09/2020     Lab Results   Component Value Date    CREATININE 0.7 (L) 05/09/2020    BUN 5 (L) 05/09/2020     05/09/2020    K 4.8 05/09/2020     05/09/2020    CO2 23 05/09/2020     No results found for: INR, PROTIME     No results found for: PHOS   No results for input(s): PH, PO2ART, NPM7BGH, HCO3, BEART, O2SAT in the last 72 hours. Wt Readings from Last 3 Encounters:   05/11/20 193 lb 12.6 oz (87.9 kg)               ASSESMENT  Ac bronchospasm  Ac bronchitis  covid 2 test negative        PLAN  1. cpm  2. Increase flonase  3.  Transfer out of covid unit    5/11/2020  Noam Marquez M.D.

## 2020-05-12 VITALS
SYSTOLIC BLOOD PRESSURE: 150 MMHG | WEIGHT: 202.5 LBS | HEIGHT: 67 IN | DIASTOLIC BLOOD PRESSURE: 81 MMHG | TEMPERATURE: 97.9 F | BODY MASS INDEX: 31.78 KG/M2 | HEART RATE: 91 BPM | OXYGEN SATURATION: 95 % | RESPIRATION RATE: 18 BRPM

## 2020-05-12 PROCEDURE — 94640 AIRWAY INHALATION TREATMENT: CPT

## 2020-05-12 PROCEDURE — 6360000002 HC RX W HCPCS: Performed by: NURSE PRACTITIONER

## 2020-05-12 PROCEDURE — 6370000000 HC RX 637 (ALT 250 FOR IP): Performed by: INTERNAL MEDICINE

## 2020-05-12 PROCEDURE — 6370000000 HC RX 637 (ALT 250 FOR IP): Performed by: NURSE PRACTITIONER

## 2020-05-12 PROCEDURE — 2580000003 HC RX 258: Performed by: PHYSICIAN ASSISTANT

## 2020-05-12 PROCEDURE — 94761 N-INVAS EAR/PLS OXIMETRY MLT: CPT

## 2020-05-12 RX ORDER — OXYMETAZOLINE HYDROCHLORIDE 0.05 G/100ML
2 SPRAY NASAL 2 TIMES DAILY
Qty: 1 BOTTLE | Refills: 0 | Status: SHIPPED
Start: 2020-05-12 | End: 2020-06-11

## 2020-05-12 RX ORDER — AZITHROMYCIN 500 MG/1
500 TABLET, FILM COATED ORAL DAILY
Qty: 3 TABLET | Refills: 0 | Status: SHIPPED | OUTPATIENT
Start: 2020-05-12 | End: 2020-05-15

## 2020-05-12 RX ORDER — FLUTICASONE PROPIONATE 50 MCG
1 SPRAY, SUSPENSION (ML) NASAL 2 TIMES DAILY
Qty: 1 BOTTLE | Refills: 2 | Status: SHIPPED | OUTPATIENT
Start: 2020-05-12

## 2020-05-12 RX ORDER — PREDNISONE 10 MG/1
TABLET ORAL
Qty: 30 TABLET | Refills: 0 | Status: SHIPPED | OUTPATIENT
Start: 2020-05-12 | End: 2020-05-22

## 2020-05-12 RX ADMIN — DOXAZOSIN 2 MG: 4 TABLET ORAL at 08:20

## 2020-05-12 RX ADMIN — METHYLPREDNISOLONE SODIUM SUCCINATE 40 MG: 40 INJECTION, POWDER, FOR SOLUTION INTRAMUSCULAR; INTRAVENOUS at 05:39

## 2020-05-12 RX ADMIN — SODIUM CHLORIDE, PRESERVATIVE FREE 10 ML: 5 INJECTION INTRAVENOUS at 08:20

## 2020-05-12 RX ADMIN — Medication 1 CAPSULE: at 08:20

## 2020-05-12 RX ADMIN — PANTOPRAZOLE SODIUM 40 MG: 40 TABLET, DELAYED RELEASE ORAL at 05:39

## 2020-05-12 RX ADMIN — BUDESONIDE AND FORMOTEROL FUMARATE DIHYDRATE 2 PUFF: 160; 4.5 AEROSOL RESPIRATORY (INHALATION) at 08:43

## 2020-05-12 RX ADMIN — OXYMETAZOLINE HYDROCHLORIDE 2 SPRAY: 5 SPRAY NASAL at 08:20

## 2020-05-12 RX ADMIN — Medication 1 SPRAY: at 08:20

## 2020-05-12 ASSESSMENT — PAIN SCALES - GENERAL: PAINLEVEL_OUTOF10: 0

## 2020-05-12 NOTE — DISCHARGE SUMMARY
Discharge Summary Note  Patient ID:  Clarence Santos  [id number]  39 y.o.  1955    Admit date: 5/8/2020    Discharge date and time: 5/12/2020 12:57 PM     Admitting Physician: Jensen Blancas MD     Discharge Physician: Harini Phillips MD    Admission Diagnoses:   Shortness of breath [R06.02]  Tachycardia [R00.0]  Bronchitis [J40]  Dyspnea, unspecified type [R06.00]    Discharge Diagnoses and Hospital Course:   Acute on chronic Bronchitis; likely background undiagnosed COPD  CTA without acute process or PE.  Mild bronchial wall thickening centrally as well as small amount of secretions within a bronchus in the posterior RUL   Started on breathing treatment, steroids and abx>>to be continued on discharge with oral prednisone and azithromycin  COVID 19 test negative  CRP and Procal both not elevated  Pulmo on board, who agreed with discharge and to be followed as out patient  Patient will benefit from OP PFT  Pulmonology has increased Flonase, to be continued on discharge with Afrin as needed for maximum of 3-5 days  Advised to use humidifier for better sinus drainage and to relieve congestion  To check with PCP regarding reconsidering aspirin with other alternative anti platelets if he can    Hypertension  To follow up closely as out patient, to determine need for any medications    Admission Condition: stable    Discharged Condition: stable    Consults: Pulmonary    Significant Diagnostic Studies:   CBC with Differential:    Lab Results   Component Value Date    WBC 10.6 05/09/2020    RBC 4.77 05/09/2020    HGB 14.5 05/09/2020    HCT 44.8 05/09/2020     05/09/2020    MCV 93.9 05/09/2020    MCH 30.4 05/09/2020    MCHC 32.4 05/09/2020    RDW 12.9 05/09/2020    SEGSPCT 89.4 05/09/2020    LYMPHOPCT 7.0 05/09/2020    MONOPCT 1.1 05/09/2020    BASOPCT 0.5 05/09/2020    MONOSABS 0.1 05/09/2020    LYMPHSABS 0.7 05/09/2020    EOSABS 0.2 05/09/2020    BASOSABS 0.1 05/09/2020    DIFFTYPE AUTOMATED DIFFERENTIAL 05/09/2020     CMP:    Lab Results   Component Value Date     05/09/2020    K 4.8 05/09/2020     05/09/2020    CO2 23 05/09/2020    BUN 5 05/09/2020    CREATININE 0.7 05/09/2020    GFRAA >60 05/09/2020    LABGLOM >60 05/09/2020    GLUCOSE 144 05/09/2020    PROT 6.9 05/08/2020    LABALBU 4.0 05/08/2020    CALCIUM 8.9 05/09/2020    BILITOT 0.4 05/08/2020    ALKPHOS 77 05/08/2020    AST 21 05/08/2020    ALT 26 05/08/2020     Xr Chest Portable    Result Date: 5/8/2020  EXAMINATION: ONE XRAY VIEW OF THE CHEST 5/8/2020 5:30 pm COMPARISON: April 15, 2009 HISTORY: ORDERING SYSTEM PROVIDED HISTORY: chest pain TECHNOLOGIST PROVIDED HISTORY: Reason for exam:->chest pain Reason for Exam: chest pain, cough, sob x 7 weeks Acuity: Acute Type of Exam: Initial Additional signs and symptoms: na Relevant Medical/Surgical History: na FINDINGS: Cardiac silhouette is normal in size. Lungs are clear. No acute bony abnormality. No acute findings     Cta Chest W Contrast    Result Date: 5/8/2020  EXAMINATION: CTA OF THE CHEST 5/8/2020 8:00 pm TECHNIQUE: CTA of the chest was performed after the administration of intravenous contrast.  Multiplanar reformatted images are provided for review. MIP images are provided for review. Dose modulation, iterative reconstruction, and/or weight based adjustment of the mA/kV was utilized to reduce the radiation dose to as low as reasonably achievable. COMPARISON: None. HISTORY: ORDERING SYSTEM PROVIDED HISTORY: cough x7 weeks, long distance , eval for PE TECHNOLOGIST PROVIDED HISTORY: Reason for exam:->cough x7 weeks, long distance , eval for PE Reason for Exam: cough x7 weeks, long distance , eval for PE Acuity: Acute Type of Exam: Initial Additional signs and symptoms: no Relevant Medical/Surgical History: 90 ml isovue 370 used FINDINGS: Pulmonary Arteries: Pulmonary arteries are adequately opacified for evaluation.   No evidence of intraluminal filling defect to suggest pulmonary embolism. Main pulmonary artery is normal in caliber. Mediastinum: No evidence of mediastinal lymphadenopathy. The heart and pericardium demonstrate no acute abnormality. There is no acute abnormality of the thoracic aorta. Lungs/pleura: No pneumothorax or pleural effusion. Mild bronchial wall thickening centrally. Small amount of secretions within a bronchus supplying the posterior right upper lobe. Upper Abdomen: Small hiatal hernia. Soft Tissues/Bones: No acute bone or soft tissue abnormality. No pulmonary embolus. Mild bronchial wall thickening centrally as well as small amount of secretions within a bronchus in the posterior right upper lobe. Discharge Exam:  GEN    Awake male, sitting upright in bed in no apparent distress. Appears given age. HEENT            NCAT  RESP  Mostly clear, may have slight wheeze   CARDIO/VASC           S1/S2 auscultated. RRR  GI        Soft nt nd          Steele catheter is not present. MSK/ext          No gross joint deformities. No edema  SKIN    Normal coloration, warm, dry. NEURO           Cranial nerves appear grossly intact, normal speech, no lateralizing weakness. PSYCH            Awake, alert, oriented.   Affect appropriate    Disposition: home    Patient Instructions:     Discharge Medications:   Barbara Jules   Home Medication Instructions [id number]    Printed on:05/12/20 1322   Medication Information                      albuterol sulfate  (90 Base) MCG/ACT inhaler  Inhale 2 puffs into the lungs every 6 hours as needed             azithromycin (ZITHROMAX) 500 MG tablet  Take 1 tablet by mouth daily for 3 days             fluticasone (FLONASE) 50 MCG/ACT nasal spray  1 spray by Each Nostril route 2 times daily             omeprazole (PRILOSEC) 20 MG delayed release capsule  Take 20 mg by mouth daily             oxymetazoline (AFRIN) 0.05 % nasal spray  2 sprays by Nasal route 2 times daily

## 2020-05-14 LAB
CULTURE: NORMAL
EKG ATRIAL RATE: 102 BPM
EKG DIAGNOSIS: NORMAL
EKG P AXIS: 60 DEGREES
EKG P-R INTERVAL: 140 MS
EKG Q-T INTERVAL: 352 MS
EKG QRS DURATION: 90 MS
EKG QTC CALCULATION (BAZETT): 458 MS
EKG R AXIS: 40 DEGREES
EKG T AXIS: 72 DEGREES
EKG VENTRICULAR RATE: 102 BPM
Lab: NORMAL
SPECIMEN: NORMAL